# Patient Record
Sex: FEMALE | Race: WHITE | NOT HISPANIC OR LATINO
[De-identification: names, ages, dates, MRNs, and addresses within clinical notes are randomized per-mention and may not be internally consistent; named-entity substitution may affect disease eponyms.]

---

## 2017-11-17 ENCOUNTER — RESULT REVIEW (OUTPATIENT)
Age: 52
End: 2017-11-17

## 2018-10-30 ENCOUNTER — RECORD ABSTRACTING (OUTPATIENT)
Age: 53
End: 2018-10-30

## 2018-10-30 DIAGNOSIS — Z80.0 FAMILY HISTORY OF MALIGNANT NEOPLASM OF DIGESTIVE ORGANS: ICD-10-CM

## 2018-11-09 ENCOUNTER — RECORD ABSTRACTING (OUTPATIENT)
Age: 53
End: 2018-11-09

## 2018-11-09 LAB — CYTOLOGY CVX/VAG DOC THIN PREP: NORMAL

## 2018-12-07 ENCOUNTER — APPOINTMENT (OUTPATIENT)
Dept: OBGYN | Facility: CLINIC | Age: 53
End: 2018-12-07
Payer: COMMERCIAL

## 2018-12-07 VITALS — DIASTOLIC BLOOD PRESSURE: 80 MMHG | SYSTOLIC BLOOD PRESSURE: 124 MMHG

## 2018-12-07 VITALS — HEIGHT: 65 IN | BODY MASS INDEX: 41.82 KG/M2 | WEIGHT: 251 LBS

## 2018-12-07 DIAGNOSIS — Z92.89 PERSONAL HISTORY OF OTHER MEDICAL TREATMENT: ICD-10-CM

## 2018-12-07 DIAGNOSIS — Z98.890 OTHER SPECIFIED POSTPROCEDURAL STATES: ICD-10-CM

## 2018-12-07 DIAGNOSIS — Z00.00 ENCOUNTER FOR GENERAL ADULT MEDICAL EXAMINATION W/OUT ABNORMAL FINDINGS: ICD-10-CM

## 2018-12-07 DIAGNOSIS — R10.2 PELVIC AND PERINEAL PAIN: ICD-10-CM

## 2018-12-07 DIAGNOSIS — Z12.4 ENCOUNTER FOR SCREENING FOR MALIGNANT NEOPLASM OF CERVIX: ICD-10-CM

## 2018-12-07 DIAGNOSIS — Z87.898 PERSONAL HISTORY OF OTHER SPECIFIED CONDITIONS: ICD-10-CM

## 2018-12-07 PROCEDURE — 99396 PREV VISIT EST AGE 40-64: CPT

## 2018-12-18 LAB — CYTOLOGY CVX/VAG DOC THIN PREP: NORMAL

## 2019-03-05 ENCOUNTER — APPOINTMENT (OUTPATIENT)
Dept: GASTROENTEROLOGY | Facility: CLINIC | Age: 54
End: 2019-03-05
Payer: COMMERCIAL

## 2019-03-05 VITALS
WEIGHT: 250 LBS | SYSTOLIC BLOOD PRESSURE: 131 MMHG | HEART RATE: 84 BPM | DIASTOLIC BLOOD PRESSURE: 75 MMHG | BODY MASS INDEX: 41.65 KG/M2 | HEIGHT: 65 IN

## 2019-03-05 DIAGNOSIS — Z91.89 OTHER SPECIFIED PERSONAL RISK FACTORS, NOT ELSEWHERE CLASSIFIED: ICD-10-CM

## 2019-03-05 PROCEDURE — 99202 OFFICE O/P NEW SF 15 MIN: CPT

## 2019-03-07 NOTE — ASSESSMENT
[FreeTextEntry1] : this is a [age] year old woman who is here to discuss her first colonoscopy.\par as she is aware, her Dad has had multiple colon polyps, including several polyps which contained in situ cancer. \par AS WE OBTAIN INFORMED CONSENT FOR COLONOSCOPY, UPPER ENDOSCOPY [EGD], OR BOTH PROCEDURES TOGETHER;\par \par As with all procedures, there are risks of which the patient should be aware\par \par 1.  Anesthesia; deep sedation with Propofol;  there is a small risk of aspiration and pulmonary infection.  The Anesthesiologist meets with the patient the morning of the procedure to discuss in more detail\par \par 2.  risk of bleeding; from removal of a polyp, or rarely, from biopsies, 1 % or less\par \par 3.  risk of injury or perforation of the colon or upper GI tract; one in a thousand or less,  from removing a polyp or from advancing the instrument\par \par \par DR FIELD RECOMMENDATIONS FOR OPTIMAL BOWEL PREP\par \par 1. split the Miralax drink: half the night before, and half the morning of\par 2.  dulcolax 10 mg [2 5 mg tablets]  before each half of the drink\par 3.  diet for breakfast and lunch the day before prep; light solid food, easily chewable, easily digested\par 4.  begin the prep the night before sometime after five pm, one hour after dulcolax is taken\par 5.  the night before; goal is to have loose bowel movements; take one to three extra doses of Miralax, either a 17 gram packet, or a scoopful of the powder, if needed to achieve loose stools\par 6.  the goal the morning of is to have clear liquid stools; otherwise, after the second half of the prep, you should take one to three extra doses of Miralax [see above]\par 7.  remember; the extra doses are only if you are not achieving good results; and the well prepped colon allows a more complete exam\par

## 2019-03-07 NOTE — REVIEW OF SYSTEMS
[Negative] : Genitourinary [Fever] : no fever [Chills] : no chills [Feeling Poorly] : not feeling poorly [Feeling Tired] : not feeling tired

## 2019-03-07 NOTE — HISTORY OF PRESENT ILLNESS
[FreeTextEntry1] : this is a [age] year old patient, who is here to discuss her first colonoscopy.\par no colon sx; ie no rectal bleeding, bowel movements are regular and normal\par \par no hx of abdominal or pelvic surgery.\par She takes no prescription meds\par Her PCP is Dr Julio Silva...\par

## 2019-03-14 ENCOUNTER — APPOINTMENT (OUTPATIENT)
Dept: GASTROENTEROLOGY | Facility: HOSPITAL | Age: 54
End: 2019-03-14

## 2019-12-25 PROBLEM — R10.2 PELVIC PAIN IN FEMALE: Status: ACTIVE | Noted: 2018-12-07

## 2021-04-20 ENCOUNTER — APPOINTMENT (OUTPATIENT)
Dept: OBGYN | Facility: CLINIC | Age: 56
End: 2021-04-20
Payer: COMMERCIAL

## 2021-04-20 VITALS
TEMPERATURE: 98.1 F | HEIGHT: 65 IN | BODY MASS INDEX: 44.98 KG/M2 | WEIGHT: 270 LBS | SYSTOLIC BLOOD PRESSURE: 130 MMHG | DIASTOLIC BLOOD PRESSURE: 80 MMHG

## 2021-04-20 DIAGNOSIS — R92.2 INCONCLUSIVE MAMMOGRAM: ICD-10-CM

## 2021-04-20 DIAGNOSIS — Z01.419 ENCOUNTER FOR GYNECOLOGICAL EXAMINATION (GENERAL) (ROUTINE) W/OUT ABNORMAL FINDINGS: ICD-10-CM

## 2021-04-20 PROCEDURE — 99072 ADDL SUPL MATRL&STAF TM PHE: CPT

## 2021-04-20 PROCEDURE — 99396 PREV VISIT EST AGE 40-64: CPT

## 2021-04-21 LAB — HPV HIGH+LOW RISK DNA PNL CVX: NOT DETECTED

## 2021-04-23 LAB — CYTOLOGY CVX/VAG DOC THIN PREP: NORMAL

## 2021-05-03 NOTE — HISTORY OF PRESENT ILLNESS
[TextBox_4] : 57yo P4 here for annual exam.   since 54yo.  No PMB.  No hot flashes. No vaginal dryness; +sexually active without issues. 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  Lives with  and 4 children- 18-25yo. \par Oldest son has joined the [employer] and is deployed in [place]. \par

## 2022-11-18 DIAGNOSIS — Z12.31 ENCOUNTER FOR SCREENING MAMMOGRAM FOR MALIGNANT NEOPLASM OF BREAST: ICD-10-CM

## 2023-01-18 ENCOUNTER — RESULT REVIEW (OUTPATIENT)
Age: 58
End: 2023-01-18

## 2024-06-13 ENCOUNTER — NON-APPOINTMENT (OUTPATIENT)
Age: 59
End: 2024-06-13